# Patient Record
Sex: MALE | URBAN - METROPOLITAN AREA
[De-identification: names, ages, dates, MRNs, and addresses within clinical notes are randomized per-mention and may not be internally consistent; named-entity substitution may affect disease eponyms.]

---

## 2018-11-16 ENCOUNTER — OFFICE VISIT (OUTPATIENT)
Dept: URBAN - METROPOLITAN AREA CLINIC 62 | Facility: CLINIC | Age: 38
End: 2018-11-16
Payer: OTHER GOVERNMENT

## 2018-11-16 DIAGNOSIS — H53.19 OTHER SUBJECTIVE VISUAL DISTURBANCES: Primary | ICD-10-CM

## 2018-11-16 PROCEDURE — 92134 CPTRZ OPH DX IMG PST SGM RTA: CPT | Performed by: OPHTHALMOLOGY

## 2018-11-16 PROCEDURE — 92004 COMPRE OPH EXAM NEW PT 1/>: CPT | Performed by: OPHTHALMOLOGY

## 2018-11-16 ASSESSMENT — VISUAL ACUITY
OD: 20/20
OS: 20/20

## 2018-11-16 ASSESSMENT — INTRAOCULAR PRESSURE
OS: 19
OD: 18

## 2018-11-16 ASSESSMENT — KERATOMETRY
OS: 40.63
OD: 40.25

## 2018-11-16 NOTE — IMPRESSION/PLAN
Impression: Other subjective visual disturbances: H53.19. Plan: OCT macula and NML pentacam.  No irregular astigmatism. Unable to tolerate MRX. Sugest re-check MRX.